# Patient Record
Sex: FEMALE | Race: BLACK OR AFRICAN AMERICAN | Employment: FULL TIME | URBAN - METROPOLITAN AREA
[De-identification: names, ages, dates, MRNs, and addresses within clinical notes are randomized per-mention and may not be internally consistent; named-entity substitution may affect disease eponyms.]

---

## 2023-05-09 ENCOUNTER — HOSPITAL ENCOUNTER (EMERGENCY)
Facility: HOSPITAL | Age: 23
Discharge: HOME | End: 2023-05-09
Attending: EMERGENCY MEDICINE
Payer: MEDICAID

## 2023-05-09 VITALS
HEART RATE: 90 BPM | SYSTOLIC BLOOD PRESSURE: 142 MMHG | HEIGHT: 62 IN | DIASTOLIC BLOOD PRESSURE: 98 MMHG | RESPIRATION RATE: 18 BRPM | OXYGEN SATURATION: 100 % | BODY MASS INDEX: 27.6 KG/M2 | WEIGHT: 150 LBS | TEMPERATURE: 98.4 F

## 2023-05-09 DIAGNOSIS — R21 RASH: Primary | ICD-10-CM

## 2023-05-09 PROCEDURE — 6370000000 HC RX 637 (ALT 250 FOR IP)

## 2023-05-09 PROCEDURE — 99283 EMERGENCY DEPT VISIT LOW MDM: CPT

## 2023-05-09 RX ORDER — DIAPER,BRIEF,INFANT-TODD,DISP
EACH MISCELLANEOUS
Qty: 30 G | Refills: 1 | Status: SHIPPED | OUTPATIENT
Start: 2023-05-09 | End: 2023-05-16

## 2023-05-09 RX ORDER — DIPHENHYDRAMINE HCL 25 MG
25 CAPSULE ORAL
Status: COMPLETED | OUTPATIENT
Start: 2023-05-09 | End: 2023-05-09

## 2023-05-09 RX ADMIN — DIPHENHYDRAMINE HYDROCHLORIDE 25 MG: 25 CAPSULE ORAL at 13:33

## 2023-05-09 ASSESSMENT — ENCOUNTER SYMPTOMS
EYE DISCHARGE: 0
WHEEZING: 0
COUGH: 0
STRIDOR: 0
TROUBLE SWALLOWING: 0
NAUSEA: 0
EYE REDNESS: 0
VOICE CHANGE: 0
EYE ITCHING: 0
VOMITING: 0
CHOKING: 0
DIARRHEA: 0
SHORTNESS OF BREATH: 0
BACK PAIN: 0
SORE THROAT: 0
ABDOMINAL PAIN: 0

## 2023-05-09 NOTE — DISCHARGE INSTRUCTIONS
Please avoid drinking coffee as this may have caused your symptoms. Please follow up with your primary care doctor. Return to the ER with new or worsening symptoms.

## 2023-05-09 NOTE — ED PROVIDER NOTES
[facility] EMERGENCY DEPT  EMERGENCY DEPARTMENT ENCOUNTER      Pt Name: Susan Reid  MRN: [id number]  [place] 2000  Date of evaluation: 5/9/2023  Provider: Kareem Stewart MD    [address] [place] [address]       Chief Complaint   Patient presents with    Rash         HISTORY OF PRESENT ILLNESS   (Location/Symptom, Timing/Onset, Context/Setting, Quality, Duration, Modifying Factors, Severity)  Note limiting factors. Otherwise healthy 24 y/o female presents to the ED for evaluation of rash to her L forearm. Patient states she tried coffee for the first time around 10 this morning and then an hour later began to feel itchy and noticed a red rash localized to her forearm. Denies any associated headache, tongue/throat/lip swelling, dysphagia, dyspnea, abdominal pain, n/v. No new soaps, lotions, detergents, etc. No known allergies. No recent hiking/camping or exposure to outdoor plants. No recent illness. The history is provided by the patient. Nursing Notes were reviewed. REVIEW OF SYSTEMS    (2-9 systems for level 4, 10 or more for level 5)     Review of Systems   Constitutional:  Negative for chills and fever. HENT:  Negative for congestion, sore throat, trouble swallowing and voice change. Eyes:  Negative for discharge, redness and itching. Respiratory:  Negative for cough, choking, shortness of breath, wheezing and stridor. Cardiovascular:  Negative for chest pain and palpitations. Gastrointestinal:  Negative for abdominal pain, diarrhea, nausea and vomiting. Genitourinary:  Negative for dysuria and hematuria. Musculoskeletal:  Negative for back pain and neck pain. Skin:  Positive for rash. Allergic/Immunologic: Negative for environmental allergies and food allergies. Neurological:  Negative for syncope and headaches. Except as noted above the remainder of the review of systems was reviewed and negative. PAST MEDICAL HISTORY   No past medical history on file.       SURGICAL

## 2023-05-09 NOTE — ED TRIAGE NOTES
Pt arrives to ed w reports of rash and itching over L arm following first time drinking coffee approx 1030.